# Patient Record
Sex: MALE | Race: WHITE | NOT HISPANIC OR LATINO | Employment: UNEMPLOYED
[De-identification: names, ages, dates, MRNs, and addresses within clinical notes are randomized per-mention and may not be internally consistent; named-entity substitution may affect disease eponyms.]

---

## 2023-12-19 ENCOUNTER — APPOINTMENT (OUTPATIENT)
Dept: MEDICAL GROUP | Facility: CLINIC | Age: 33
End: 2023-12-19
Payer: MEDICAID

## 2023-12-21 ENCOUNTER — HOSPITAL ENCOUNTER (OUTPATIENT)
Facility: MEDICAL CENTER | Age: 33
End: 2023-12-21
Payer: MEDICAID

## 2023-12-21 ENCOUNTER — OFFICE VISIT (OUTPATIENT)
Dept: MEDICAL GROUP | Facility: CLINIC | Age: 33
End: 2023-12-21
Payer: MEDICAID

## 2023-12-21 VITALS
WEIGHT: 209 LBS | OXYGEN SATURATION: 94 % | SYSTOLIC BLOOD PRESSURE: 134 MMHG | TEMPERATURE: 97.9 F | BODY MASS INDEX: 29.26 KG/M2 | HEART RATE: 79 BPM | HEIGHT: 71 IN | DIASTOLIC BLOOD PRESSURE: 84 MMHG

## 2023-12-21 DIAGNOSIS — F41.9 ANXIETY: ICD-10-CM

## 2023-12-21 DIAGNOSIS — K62.5 RECTAL BLEEDING: ICD-10-CM

## 2023-12-21 DIAGNOSIS — F43.10 PTSD (POST-TRAUMATIC STRESS DISORDER): ICD-10-CM

## 2023-12-21 DIAGNOSIS — F11.90 OPIOID USE DISORDER: ICD-10-CM

## 2023-12-21 DIAGNOSIS — Z11.3 ROUTINE SCREENING FOR STI (SEXUALLY TRANSMITTED INFECTION): ICD-10-CM

## 2023-12-21 DIAGNOSIS — R35.0 URINARY FREQUENCY: ICD-10-CM

## 2023-12-21 DIAGNOSIS — Z51.81 ENCOUNTER FOR MONITORING SUBOXONE MAINTENANCE THERAPY: ICD-10-CM

## 2023-12-21 DIAGNOSIS — Z79.899 ENCOUNTER FOR MONITORING SUBOXONE MAINTENANCE THERAPY: ICD-10-CM

## 2023-12-21 PROCEDURE — 80307 DRUG TEST PRSMV CHEM ANLYZR: CPT

## 2023-12-21 PROCEDURE — 3079F DIAST BP 80-89 MM HG: CPT

## 2023-12-21 PROCEDURE — 99214 OFFICE O/P EST MOD 30 MIN: CPT | Mod: GC

## 2023-12-21 PROCEDURE — 3075F SYST BP GE 130 - 139MM HG: CPT

## 2023-12-21 PROCEDURE — G0480 DRUG TEST DEF 1-7 CLASSES: HCPCS

## 2023-12-21 RX ORDER — BUPRENORPHINE AND NALOXONE 8; 2 MG/1; MG/1
1 FILM, SOLUBLE BUCCAL; SUBLINGUAL
Qty: 16 EACH | Refills: 0 | Status: SHIPPED | OUTPATIENT
Start: 2023-12-21 | End: 2023-12-29

## 2023-12-21 NOTE — PROGRESS NOTES
[facility]     PATIENT ID:  NAME:  Vikas Ghosh  MRN:               [id number]  YOB: 1990    Date: 3:27 PM      Resident: Jonah Velez M.D.    CC:  Opiate use disorder    HPI: Vikas Ghosh is a 33 y.o. male who presented for treatment related to opiate use disorder. Currently living in a sober living house. Last use of fentanyl was 3 weeks ago. When he was using more heavy he states he was up to 1g per day. Seen in the ED and wanted to start suboxone treatment. Was given 9 strips of 8-2mg Suboxone. Currently has 1.5 strips left. No other illicit drugs, no alcohol use. Cigarette currently smokes 1ppd.  Patient reports an additional history of PTSD and anxiety related to the time he spent in jail.  He states that he is interested in starting psychotherapy as well as discussing this with psychiatry.  He states that he had been using clonazepam 1 mg twice daily as well.  He also states that during his time in jail he was seen by a physician for concern about blood in the toilet.  He reports no blood in his urine but there would be blood mixed in with his stool.  He was evaluated for hemorrhoids and he states that he was advised to be seen by urology for concerns of potential prostate cancer.  Patient does note urinary frequency but denies any sensation of dysuria.    No problems updated.    REVIEW OF SYSTEMS:   Ten systems reviewed and were negative except as noted in the HPI.                PROBLEM LIST  There is no problem list on file for this patient.       PAST SURGICAL HISTORY:  No past surgical history on file.    FAMILY HISTORY:  No family history on file.    SOCIAL HISTORY:   Social History     Tobacco Use    Smoking status: Every Day     Types: Cigarettes     Passive exposure: Current    Smokeless tobacco: Never   Substance Use Topics    Alcohol use: Not Currently       ALLERGIES:  No Known Allergies    OUTPATIENT MEDICATIONS:    Current Outpatient Medications:      "tamsulosin (FLOMAX) 0.4 MG capsule, Take 0.4 mg by mouth 1/2 hour after breakfast., Disp: , Rfl:     albuterol 108 (90 Base) MCG/ACT Aero Soln inhalation aerosol, Inhale 2 Puffs., Disp: , Rfl:     mirtazapine (REMERON) 30 MG TABLET DISPERSIBLE, Take 30 mg by mouth every evening., Disp: , Rfl:     alprazolam (XANAX) 2 MG tablet, Take 2 mg by mouth at bedtime as needed for Sleep., Disp: , Rfl:     OLANZapine (ZYPREXA PO), Take  by mouth., Disp: , Rfl:     Buprenorphine HCl-Naloxone HCl 8-2 MG FILM, Place 1 Film under the tongue 2 times a day for 8 days., Disp: 16 Each, Rfl: 0    PHYSICAL EXAM:  Vitals:    12/21/23 1522   BP: 134/84   BP Location: Left arm   Patient Position: Sitting   BP Cuff Size: Adult   Pulse: 79   Temp: 36.6 °C (97.9 °F)   TempSrc: Temporal   SpO2: 94%   Weight: 94.8 kg (209 lb)   Height: 1.803 m (5' 11\")       General: Pt resting in NAD, pleasant and cooperative, appears slightly anxious  Skin:  Pink, warm and dry.  HEENT: NC/AT. EOMI. pupils approximately 2 mm, no associated injection  Lungs:  Symmetrical.  CTAB, good air movement, no adventitious sounds  Cardiovascular:  S1/S2 RRR, no murmurs rubs or gallops  Abdomen:  Abdomen is soft, nontender  Extremities:  Full range of motion.  CNS:  Muscle tone is normal. No gross focal neurologic deficits    Clinical  Opiate Withdrawal Scale: COWS  Resting pulse weight: 79, score of 0  Sweating over the past half hour: No reported chills or flushing, score of 0  Restlessness: Able to sit still, score of 0  Pupil size: Pupils normal for room light, score of 0  Bone or joint aches: Not present, score of 0  Runny nose or tearing: Not present, score of 0  GI upset over the past half hour: No GI upset, score of 0  Tremor: No tremor, score of 0  Yawning: No yawning, score of 0  Anxiety or irritability: Patient reports increased irritability and anxiousness, score of 1  Gooseflesh: Skin is smooth, score of 0  TOTAL SCORE: 1     DSM-V criteria for diagnosis of " opiate use disorder:  1.  Opioids are often taken in larger amounts or over a longer period of time than intended.  2.There is a persistent desire or unsuccessful efforts to cut down or control substance use  3.  A great deal of time is spent in activities necessary to obtain the opiate, use of opiate or recover from its effects.  4.  Craving, or strong desire to use opiates.  5.  Recurrent opiate use resulting in failure to fulfill major role obligations at work, school or home.  6.  Continued opiate use despite having persistent or recurrent social or interpersonal problems caused or exacerbated by the effects of the opioids.  7.  Important social, occupational or recreational activities are given up or reduced because of opiate use.  8.  Recurrent opiate use in situations in which it is physically hazardous.  9.  Continued use despite knowledge of having a persistent or recurrent physical or psychological problem that is likely to have been caused or exacerbated by opioids.  10.  Tolerance, as defined by either of the following: A need for markedly increased amounts of opiates to achieve intoxication or desired effect.  Markedly diminished effect with continued use of the same amount of an opioid.  11.  Withdrawal, as manifested by either of the following: The characteristic opiate withdrawal syndrome for the same (or a closely related) substance are taken to relieve or avoid withdrawal symptoms.  Severity index: Mild: 2-3 symptoms.  Moderate: 4-5 symptoms.  Severe: 6 or more symptoms  Patient reports positive on all 11 criteria. Indicating severe level index.     ASSESSMENT/PLAN:   33 y.o. male who presents to clinic for treatment related to opiate use disorder    Opiate use disorder: Patient reports longstanding history of fentanyl use up to 1 g/day in the past.  He states the last time he used was approximately 3 weeks ago.  Patient is currently living in a sober house.  He was seen in the emergency department  on 12/13/2023 and started on Suboxone treatment.  Patient states that since then he has been using Suboxone 8/2 mg.  He states that when he has used it twice daily he seems to have relief of his cravings.  He states he took approximately a half of a strip prior to this appointment.  Clinical opiate withdrawal scale currently a 1 which is positive for only increased irritability/anxiousness.  Per DSM-V criteria for diagnosis of opiate use disorder patient's scores 11 which is consistent with severe on their severity scale.  Controlled substance contract completed at this time.  Given patient's current level of use and relief of withdrawal symptoms with twice daily dosing of Suboxone 8/2 mg we will plan to prescribe 16 films of Suboxone 8/2 mg at this time with a plan to follow-up in clinic in 8 days.  Patient understands and agrees with this course.  Urine drug screen collected at clinic and will be sent for further evaluation and screening for Suboxone use.  Will order for hepatitis C, hepatitis B, HIV, CBC, CMP at this time for recommended lab work.    PTSD/anxiety: Will provide the patient with a referral to psychiatry for continued management.  Advised him given his history of controlled substance abuse I do not recommend continued clonazepam therapy at this time but he can discuss this further with psychiatry in the future.  Will provide the patient with a referral to psychology to start psychotherapy as well.    Rectal bleeding: Will order for colonoscopy for further evaluation.  Patient does not report current episodes of rectal bleeding but states it has been an intermittent issue over the past multiple months.  Patient has been evaluated for hemorrhoids in the past without any evidence for hemorrhoidal bleeding.    Urinary frequency: Patient notes that he has been advised to see a urologist for evaluation for prostate cancer.  I have advised him that this is unlikely given his age but will provide a referral  for urology at this time for further evaluation.  Will provide STD screening currently as well as urinalysis.    Problem List Items Addressed This Visit    None  Visit Diagnoses       Rectal bleeding        Relevant Orders    Referral to GI for Colonoscopy    Urinary frequency        Relevant Orders    Referral to Urology    Encounter for monitoring Suboxone maintenance therapy        Anxiety        Relevant Orders    Referral to Psychiatry    Referral to Psychology    PTSD (post-traumatic stress disorder)        Relevant Orders    Referral to Psychiatry    Referral to Psychology    Opioid use disorder        Relevant Medications    Buprenorphine HCl-Naloxone HCl 8-2 MG FILM    Other Relevant Orders    Referral to Psychiatry    Referral to Psychology    URINALYSIS    CBC WITH DIFFERENTIAL    Comp Metabolic Panel    HIV AG/AB COMBO ASSAY SCREENING    HEP C VIRUS ANTIBODY    HEP B SURFACE ANTIGEN    URINE DRUG SCREEN W/CONF (SEND OUT) (Completed)    BUPRENORPHINE,UR,QNT    Routine screening for STI (sexually transmitted infection)        Relevant Orders    Chlamydia/GC, PCR (Urine)    Controlled Substance Treatment Agreement            Jonah Velez M.D.  PGY-3  [facility]

## 2023-12-23 LAB
AMPHET CTO UR CFM-MCNC: NEGATIVE NG/ML
BARBITURATES CTO UR CFM-MCNC: NEGATIVE NG/ML
BENZODIAZ CTO UR CFM-MCNC: NEGATIVE NG/ML
CANNABINOIDS CTO UR CFM-MCNC: NEGATIVE NG/ML
COCAINE CTO UR CFM-MCNC: NEGATIVE NG/ML
CREAT UR-MCNC: 170.6 MG/DL (ref 20–400)
DRUG COMMENT 753798: NORMAL
METHADONE CTO UR CFM-MCNC: NEGATIVE NG/ML
OPIATES CTO UR CFM-MCNC: NEGATIVE NG/ML
PCP CTO UR CFM-MCNC: NEGATIVE NG/ML
PROPOXYPH CTO UR CFM-MCNC: NEGATIVE NG/ML

## 2023-12-25 LAB
BUPRENO GLUC URQNT 2010093: 48 NG/ML
BUPRENORPH URQNT L34157: <2 NG/ML
BUPRENORPHINE UR-MCNC: 24 NG/ML
NALOX URQNT 2005863: <100 NG/ML
NORBUPR GLUC URQNT 2010094: 69 NG/ML

## 2023-12-29 ENCOUNTER — OFFICE VISIT (OUTPATIENT)
Dept: MEDICAL GROUP | Facility: CLINIC | Age: 33
End: 2023-12-29
Payer: MEDICAID

## 2023-12-29 VITALS
WEIGHT: 206 LBS | HEIGHT: 71 IN | OXYGEN SATURATION: 98 % | TEMPERATURE: 97.9 F | SYSTOLIC BLOOD PRESSURE: 134 MMHG | DIASTOLIC BLOOD PRESSURE: 84 MMHG | HEART RATE: 82 BPM | BODY MASS INDEX: 28.84 KG/M2

## 2023-12-29 DIAGNOSIS — F11.90 OPIOID USE DISORDER: ICD-10-CM

## 2023-12-29 PROCEDURE — 3079F DIAST BP 80-89 MM HG: CPT

## 2023-12-29 PROCEDURE — 99213 OFFICE O/P EST LOW 20 MIN: CPT | Mod: GE

## 2023-12-29 PROCEDURE — 3075F SYST BP GE 130 - 139MM HG: CPT

## 2023-12-30 RX ORDER — BUPRENORPHINE AND NALOXONE 8; 2 MG/1; MG/1
1 FILM, SOLUBLE BUCCAL; SUBLINGUAL 2 TIMES DAILY
Qty: 14 EACH | Refills: 0 | Status: SHIPPED | OUTPATIENT
Start: 2023-12-30 | End: 2024-01-06

## 2023-12-30 NOTE — PROGRESS NOTES
[facility]     PATIENT ID:  NAME:  Vikas Ghosh  MRN:               [id number]  YOB: 1990    Date: 11:04 AM      Resident: Jonah Velez M.D.    CC:  Suboxone refill      HPI: Vikas Ghosh is a 33 y.o. male who presented for opioid use disorder treatment. The patient states he has been doing well since his last visit at the clinic. He has been taking suboxone as prescribed an has 1 strip left at this time.  He states he has not been experiencing any opioid withdrawal  symptoms other than mild anxiety which he has a longstanding history of.  He denies any cravings at this time and is happy with current therapy.  He is still living in a sober living house and will be starting work in the near future. Patient states otherwise he has been doing well and has no further concerns.     No problems updated.    REVIEW OF SYSTEMS:   Ten systems reviewed and were negative except as noted in the HPI.                PROBLEM LIST  There is no problem list on file for this patient.       PAST SURGICAL HISTORY:  No past surgical history on file.    FAMILY HISTORY:  No family history on file.    SOCIAL HISTORY:   Social History     Tobacco Use    Smoking status: Every Day     Types: Cigarettes     Passive exposure: Current    Smokeless tobacco: Never   Substance Use Topics    Alcohol use: Not Currently       ALLERGIES:  No Known Allergies    OUTPATIENT MEDICATIONS:    Current Outpatient Medications:     Buprenorphine HCl-Naloxone HCl 8-2 MG FILM, Place 1 Film under the tongue 2 times a day for 7 days., Disp: 14 Each, Rfl: 0    tamsulosin (FLOMAX) 0.4 MG capsule, Take 0.4 mg by mouth 1/2 hour after breakfast., Disp: , Rfl:     albuterol 108 (90 Base) MCG/ACT Aero Soln inhalation aerosol, Inhale 2 Puffs., Disp: , Rfl:     mirtazapine (REMERON) 30 MG TABLET DISPERSIBLE, Take 30 mg by mouth every evening., Disp: , Rfl:     alprazolam (XANAX) 2 MG tablet, Take 2 mg by mouth at bedtime as needed for  "Sleep., Disp: , Rfl:     OLANZapine (ZYPREXA PO), Take  by mouth., Disp: , Rfl:     PHYSICAL EXAM:  Vitals:    12/29/23 1503   BP: 134/84   BP Location: Left arm   Patient Position: Sitting   BP Cuff Size: Adult   Pulse: 82   Temp: 36.6 °C (97.9 °F)   TempSrc: Temporal   SpO2: 98%   Weight: 93.4 kg (206 lb)   Height: 1.803 m (5' 11\")       General: Pt resting in NAD, pleasant and cooperative   Skin:  Pink, warm and dry. No diaphoresis.  HEENT: NC/AT. EOMI. Pupils 3mm  Lungs:  Symmetrical.  CTAB, good air movement, no adventitious sounds   Cardiovascular:  S1/S2 RRR, no murmurs rubs or gallops   Abdomen:  Abdomen is soft, nontender  Extremities:  Full range of motion.  CNS:  Muscle tone is normal. No gross focal neurologic deficits, no tremor.     Clinical  Opiate Withdrawal Scale: COWS  Resting pulse weight: 79, score of 0  Sweating over the past half hour: No reported chills or flushing, score of 0  Restlessness: Able to sit still, score of 0  Pupil size: Pupils normal for room light, score of 0  Bone or joint aches: Not present, score of 0  Runny nose or tearing: Not present, score of 0  GI upset over the past half hour: No GI upset, score of 0  Tremor: No tremor, score of 0  Yawning: No yawning, score of 0  Anxiety or irritability: Patient reports increased irritability and anxiousness, score of 1  Gooseflesh: Skin is smooth, score of 0  TOTAL SCORE: 1     ASSESSMENT/PLAN:   33 y.o. male who presents to the clinic for treatment of opioid use disorder. The patient states he has been doing well on current treatment dose of buprenorphine Naloxone 8-2mg BID. He does note that he would prefer the pills over the strips but has not found a pharmacy that would have supply of the pills. He denies any current withdrawal symptoms and denies any cravings. He has reestablished with psychiatry and will be seeing them via virtual visit next week. He is continuing to live in a sober living house which has been working well for " him.  He will be starting work in the next 2 weeks and is looking forward to this. Lab orders from last weeks visit are still pending and patient states he will have these completed soon. Otherwise the patient appears much more relaxed and less anxious on examination. No concerns with current treatment plan. Will prescribe 1 weeks worth of current buprenorphine Naloxone 8-2mg supply and plan to have patient to return to clinic on Friday 1/5/24 to continue treatment plan. Counseled patient that I would not be available today but will continue to follow him in 2 weeks when my clinic schedule is available on Fridays.     Problem List Items Addressed This Visit    None  Visit Diagnoses       Opioid use disorder        Relevant Medications    Buprenorphine HCl-Naloxone HCl 8-2 MG FILM            Jonah Velez M.D.  PGY-3  [facility]

## 2024-01-02 ENCOUNTER — OFFICE VISIT (OUTPATIENT)
Dept: UROLOGY | Facility: MEDICAL CENTER | Age: 34
End: 2024-01-02
Payer: MEDICAID

## 2024-01-02 VITALS
SYSTOLIC BLOOD PRESSURE: 109 MMHG | WEIGHT: 211.1 LBS | HEART RATE: 82 BPM | OXYGEN SATURATION: 99 % | BODY MASS INDEX: 29.55 KG/M2 | DIASTOLIC BLOOD PRESSURE: 60 MMHG | HEIGHT: 71 IN

## 2024-01-02 DIAGNOSIS — R35.0 URINARY FREQUENCY: Primary | ICD-10-CM

## 2024-01-02 LAB
APPEARANCE UR: CLEAR
BILIRUB UR STRIP-MCNC: NORMAL MG/DL
COLOR UR AUTO: YELLOW
GLUCOSE UR STRIP.AUTO-MCNC: NORMAL MG/DL
KETONES UR STRIP.AUTO-MCNC: NORMAL MG/DL
LEUKOCYTE ESTERASE UR QL STRIP.AUTO: NORMAL
NITRITE UR QL STRIP.AUTO: NORMAL
PH UR STRIP.AUTO: 7 [PH] (ref 5–8)
POC POST-VOID: 16 ML
POC PRE-VOID: 126 ML
PROT UR QL STRIP: NORMAL MG/DL
RBC UR QL AUTO: NORMAL
SP GR UR STRIP.AUTO: 1.02
UROBILINOGEN UR STRIP-MCNC: 0.2 MG/DL

## 2024-01-02 PROCEDURE — 99204 OFFICE O/P NEW MOD 45 MIN: CPT

## 2024-01-02 PROCEDURE — 51798 US URINE CAPACITY MEASURE: CPT

## 2024-01-02 PROCEDURE — 81002 URINALYSIS NONAUTO W/O SCOPE: CPT

## 2024-01-02 PROCEDURE — 3074F SYST BP LT 130 MM HG: CPT

## 2024-01-02 PROCEDURE — 3078F DIAST BP <80 MM HG: CPT

## 2024-01-02 RX ORDER — OXYBUTYNIN CHLORIDE 10 MG/1
10 TABLET, EXTENDED RELEASE ORAL DAILY
Qty: 30 TABLET | Refills: 3 | Status: SHIPPED | OUTPATIENT
Start: 2024-01-02

## 2024-01-02 NOTE — PROGRESS NOTES
Subjective  Vikas Ghosh is a 33 y.o. male who presents today for evaluation of urinary frequency- voiding every 1-2 hours, nocturia voiding 6-7 times per night, hesitancy at times, and urgency without leakage.  Patient denies hematuria, weak urinary stream, and dysuria    Patient reports that he was in [facility] for 20 months.  At that time patient was using fentanyl.  Patient reports that he has been clean and is sober at this time patient. Patient reports that urinary frequency was better with fentanyl use, only voiding 2 times per day and at this time voiding every 1-2 hours and feeling like he does not completely empty.  Patient denies incontinence.  Patient reports that he has regular bowel movements daily with no issues.    Patient was started on Flomax 8 months ago presents, and patient reports that symptoms have mildly improved but are still bothersome.     At this time, patient requesting PSA lab to be done because patient is concerned about size of prostate related to symptoms.      No family history on file.    Social History     Socioeconomic History    Marital status: Single     Spouse name: Not on file    Number of children: Not on file    Years of education: Not on file    Highest education level: Not on file   Occupational History    Not on file   Tobacco Use    Smoking status: Every Day     Types: Cigarettes     Passive exposure: Current    Smokeless tobacco: Never   Vaping Use    Vaping Use: Never used   Substance and Sexual Activity    Alcohol use: Not Currently     Alcohol/week: 3.6 oz     Types: 6 Cans of beer per week     Comment: occ    Drug use: Not Currently    Sexual activity: Not on file   Other Topics Concern    Not on file   Social History Narrative    Not on file     Social Determinants of Health     Financial Resource Strain: Not on file   Food Insecurity: Not on file   Transportation Needs: Not on file   Physical Activity: Not on file   Stress: Not on file   Social  "Connections: Not on file   Intimate Partner Violence: Not on file   Housing Stability: Not on file       No past surgical history on file.    No past medical history on file.    Current Outpatient Medications   Medication Sig Dispense Refill    oxybutynin SR (DITROPAN-XL) 10 MG CR tablet Take 1 Tablet by mouth every day. 30 Tablet 3    albuterol 108 (90 Base) MCG/ACT Aero Soln inhalation aerosol Inhale 2 Puffs.      Buprenorphine HCl-Naloxone HCl 8-2 MG FILM Place 1 Film under the tongue 2 times a day for 7 days. (Patient not taking: Reported on 1/2/2024) 14 Each 0    tamsulosin (FLOMAX) 0.4 MG capsule Take 0.4 mg by mouth 1/2 hour after breakfast. (Patient not taking: Reported on 1/2/2024)      mirtazapine (REMERON) 30 MG TABLET DISPERSIBLE Take 30 mg by mouth every evening. (Patient not taking: Reported on 1/2/2024)      alprazolam (XANAX) 2 MG tablet Take 2 mg by mouth at bedtime as needed for Sleep. (Patient not taking: Reported on 1/2/2024)      OLANZapine (ZYPREXA PO) Take  by mouth. (Patient not taking: Reported on 1/2/2024)       No current facility-administered medications for this visit.       No Known Allergies    Objective  /60 (BP Location: Left arm, Patient Position: Sitting, BP Cuff Size: Large adult)   Pulse 82   Ht 1.803 m (5' 11\")   Wt 95.8 kg (211 lb 1.6 oz)   SpO2 99%   Physical Exam  Constitutional:       Appearance: Normal appearance.   HENT:      Head: Normocephalic and atraumatic.   Eyes:      Extraocular Movements: Extraocular movements intact.   Pulmonary:      Effort: Pulmonary effort is normal.      Breath sounds: Normal breath sounds.   Neurological:      Mental Status: He is alert.   Psychiatric:         Mood and Affect: Mood normal.         Behavior: Behavior normal.         Labs:   none    Imaging:   none    Assessment    At this time, I have started patient on oxybutynin 10 mg p.o. daily.  Patient may continue taking his medication with Flomax.      I have also ordered a " PSA per patient request to evaluate size of prostate related to patient's symptoms I have informed patient that insurance may not cover this lab and patient may have to pay for it. At time of blood draw patient may decline PSA if unaffordable.       Plan    Problem List Items Addressed This Visit       Urinary frequency - Primary     Continue taking Flomax, and please start oxybutynin 10 mg p.o. daily.    Complete PSA before 3-month follow-up.  If lab test not covered by insurance and unaffordable this lab does not have to be completed as age is below guidelines.  This lab is being done per patient request.    Return to clinic 3 months.  I have also informed patient that he needs to see a primary care provider for labs to be completed as patient reports last labs were completed while he was in [facility] and does not have record of those previous labs.  Patient is concerned about ongoing health and management         Relevant Medications    oxybutynin SR (DITROPAN-XL) 10 MG CR tablet    Other Relevant Orders    POCT Bladder Scan (Completed)    POCT Urinalysis (Completed)    PROSTATE SPECIFIC AG SCREENING

## 2024-01-02 NOTE — ASSESSMENT & PLAN NOTE
Continue taking Flomax, and please start oxybutynin 10 mg p.o. daily.    Complete PSA before 3-month follow-up.  If lab test not covered by insurance and unaffordable this lab does not have to be completed as age is below guidelines.  This lab is being done per patient request.    Return to clinic 3 months.  I have also informed patient that he needs to see a primary care provider for labs to be completed as patient reports last labs were completed while he was in [facility] and does not have record of those previous labs.  Patient is concerned about ongoing health and management

## 2024-01-02 NOTE — PATIENT INSTRUCTIONS
Start oxybutynin today and take daily. This medication can cause constipation, dry eye and dry mouth.    Please continue taking flomax daily    Get your PSA done before 3 months visit

## 2024-01-08 ENCOUNTER — OFFICE VISIT (OUTPATIENT)
Dept: MEDICAL GROUP | Facility: CLINIC | Age: 34
End: 2024-01-08
Payer: MEDICAID

## 2024-01-08 VITALS
OXYGEN SATURATION: 99 % | HEART RATE: 75 BPM | TEMPERATURE: 97.3 F | DIASTOLIC BLOOD PRESSURE: 70 MMHG | RESPIRATION RATE: 14 BRPM | BODY MASS INDEX: 29.26 KG/M2 | WEIGHT: 209 LBS | SYSTOLIC BLOOD PRESSURE: 115 MMHG | HEIGHT: 71 IN

## 2024-01-08 DIAGNOSIS — F11.90 OPIOID USE DISORDER: ICD-10-CM

## 2024-01-08 PROCEDURE — 99213 OFFICE O/P EST LOW 20 MIN: CPT | Performed by: FAMILY MEDICINE

## 2024-01-08 PROCEDURE — 3078F DIAST BP <80 MM HG: CPT | Performed by: FAMILY MEDICINE

## 2024-01-08 PROCEDURE — 3074F SYST BP LT 130 MM HG: CPT | Performed by: FAMILY MEDICINE

## 2024-01-08 RX ORDER — BUPRENORPHINE AND NALOXONE 8; 2 MG/1; MG/1
1 FILM, SOLUBLE BUCCAL; SUBLINGUAL 2 TIMES DAILY
Qty: 28 EACH | Refills: 0 | Status: SHIPPED | OUTPATIENT
Start: 2024-01-08 | End: 2024-01-22 | Stop reason: SDUPTHER

## 2024-01-08 NOTE — PROGRESS NOTES
"Subjective:     CC: suboxone refill    HPI:   Vikas presents today with:    Opioid use disorder: meets DSM-V criteria for severe OUD    Substance use history:  Started opioids in his teenage years  Most recently preference was using fentanyl via smoking  Reports he has used via IV in the past  When asked about other substances used: he reports \"all of them\"  Currently in a [facility] sober living facility since December 8th, 2023.  Unable to use any substances at present, other than smoking cigarettes.  Might re-start marijuana in the future, helps with his anxiety.  Has history of selling substances in the past as well.  Been in and out of shelter multiple times.  Girlfriend is sober for 2 years as well.  Starting a job (), though start date was pushed to next week (Wed), which he feels irritated by.  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  Reports he can stay in his sober living situation as long as he needs to.  Has history of suboxone use on and off in the past, including while using fentanyl and while in shelter.  He feels confident that he doesn't want to use opioids, motivated to stay out of legal trouble, and wouldn't use even if he couldn't access the suboxone, but it really helps him.    Does not like NA or AA groups.  Asked about cravings: he reports thinking about it sometimes, but it's not bad.  Denies concerning dreams of use.  Denies any concerning side effects of the suboxone films.  Tolerating them well.  Didn't get much sleep last night.  Denies problems with sleep initiation.    Previously had psychiatrist and psychologist.  Plans to re-establish.  Wants to go back on previous regimen for anxiety and PTSD including: xyprexa, mood stabilizer and alprazolam.  Per chart, he was possibly on mirtazepine?  Has tried many other medications including vistaril, which he didn't think helped.  And klonipin which was too sedating.    He is not interested in trying other things or starting " "with one of his prior medications at a time.    Asked for referral info for urology and GI.  On flomax, but hasn't been taking it really    Problem   Opioid Use Disorder       Current Outpatient Medications Ordered in Epic   Medication Sig Dispense Refill    Buprenorphine HCl-Naloxone HCl 8-2 MG FILM Place 1 Film under the tongue 2 times a day for 14 days. 28 Each 0    oxybutynin SR (DITROPAN-XL) 10 MG CR tablet Take 1 Tablet by mouth every day. 30 Tablet 3    albuterol 108 (90 Base) MCG/ACT Aero Soln inhalation aerosol Inhale 2 Puffs.      tamsulosin (FLOMAX) 0.4 MG capsule Take 0.4 mg by mouth 1/2 hour after breakfast. (Patient not taking: Reported on 1/2/2024)      mirtazapine (REMERON) 30 MG TABLET DISPERSIBLE Take 30 mg by mouth every evening. (Patient not taking: Reported on 1/2/2024)      alprazolam (XANAX) 2 MG tablet Take 2 mg by mouth at bedtime as needed for Sleep. (Patient not taking: Reported on 1/2/2024)      OLANZapine (ZYPREXA PO) Take  by mouth. (Patient not taking: Reported on 1/2/2024)       No current [facility]-ordered facility-administered medications on file.       Objective:     Exam:  /70 (BP Location: Left arm, Patient Position: Sitting, BP Cuff Size: Adult)   Pulse 75   Temp 36.3 °C (97.3 °F) (Temporal)   Resp 14   Ht 1.803 m (5' 11\")   Wt 94.8 kg (209 lb)   SpO2 99%   BMI 29.15 kg/m²  Body mass index is 29.15 kg/m².    Gen: Alert and oriented, No acute distress.  Head:  NCAT, EOMI, no conjunctival injection or discharge, hearing grossly intact  Neck: Neck is supple   Lungs: Normal respiratory effort  CV: Regular rate   MSK: Unassisted gait, normal tone  Derm: No lesions on exposed skin  Psych: Normal mood and affect    Assessment & Plan:     33 y.o. male with the following -     Problem List Items Addressed This Visit       Opioid use disorder     Severe OUD in remission  Refilled suboxone 8/2mg to use 1 film twice daily.  Disp #28, to [facility] [address] [facility], no refills.    Stable on current " dose, no side effects or withdrawal symptoms  Stable living situation, starting job soon.  Gave phone number for psychiatry and psychology referral that was ordered in December.  Not interested in other treatment programs at this time such as NA.  CS agreement on file.  UDS up to date.  Reviewed [place]  report which is consistent with prescribing history.  No red flags for medication misuse or diversion.    Re-printed lab slip and referral information for other co-morbid conditions.  Offered vaccines, which patient declined.           Relevant Medications    Buprenorphine HCl-Naloxone HCl 8-2 MG FILM       Follow-up: 2 weeks

## 2024-01-08 NOTE — ASSESSMENT & PLAN NOTE
Severe OUD in remission  Refilled suboxone 8/2mg to use 1 film twice daily.  Disp #28, to [facility] [address] [facility], no refills.    Stable on current dose, no side effects or withdrawal symptoms  Stable living situation, starting job soon.  Gave phone number for psychiatry and psychology referral that was ordered in December.  Not interested in other treatment programs at this time such as NA.  CS agreement on file.  UDS up to date.  Reviewed [place]  report which is consistent with prescribing history.  No red flags for medication misuse or diversion.    Re-printed lab slip and referral information for other co-morbid conditions.  Offered vaccines, which patient declined.

## 2024-01-22 ENCOUNTER — OFFICE VISIT (OUTPATIENT)
Dept: MEDICAL GROUP | Facility: CLINIC | Age: 34
End: 2024-01-22
Payer: MEDICAID

## 2024-01-22 VITALS
WEIGHT: 216 LBS | DIASTOLIC BLOOD PRESSURE: 65 MMHG | HEIGHT: 71 IN | TEMPERATURE: 97.9 F | OXYGEN SATURATION: 100 % | HEART RATE: 64 BPM | BODY MASS INDEX: 30.24 KG/M2 | RESPIRATION RATE: 14 BRPM | SYSTOLIC BLOOD PRESSURE: 115 MMHG

## 2024-01-22 DIAGNOSIS — F41.9 ANXIETY: ICD-10-CM

## 2024-01-22 DIAGNOSIS — F11.90 OPIOID USE DISORDER: ICD-10-CM

## 2024-01-22 PROCEDURE — 99213 OFFICE O/P EST LOW 20 MIN: CPT | Performed by: FAMILY MEDICINE

## 2024-01-22 PROCEDURE — 3074F SYST BP LT 130 MM HG: CPT | Performed by: FAMILY MEDICINE

## 2024-01-22 PROCEDURE — 3078F DIAST BP <80 MM HG: CPT | Performed by: FAMILY MEDICINE

## 2024-01-22 RX ORDER — BUPRENORPHINE AND NALOXONE 8; 2 MG/1; MG/1
1 FILM, SOLUBLE BUCCAL; SUBLINGUAL 2 TIMES DAILY
Qty: 42 EACH | Refills: 0 | Status: SHIPPED | OUTPATIENT
Start: 2024-01-22 | End: 2024-02-26 | Stop reason: SDUPTHER

## 2024-01-22 NOTE — LETTER
[facility]     January 22, 2024    Patient: Vikas Ghosh   YOB: 1990   Date of Visit: 1/22/2024       To Whom It May Concern:    Vikas Ghosh was seen and treated in our department on 1/22/2024. It is my medical recommendation that Vikas uses marijuana to treat his anxiety and mental health concerns.    Sincerely,     Shani Castro M.D.

## 2024-01-22 NOTE — PROGRESS NOTES
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  Plans to do that this week.  Thinking about getting his medicinal marijuana card instead of re-starting other anxiety meds.  Was previously on alprazolam, zyprexa and vistaril, possibly mirtazepine (per chart) for anxiety and PTSD.    He reports marijuana helps on days when he feels very overwhelmed.  He did learn that he can use marijuana at the sober living facility as long as he has doctor support.    Smoking 1 ppd nicotine.  Started smoking at age 12.  Goal is to get down to smoking twice a day, just on work breaks or at night.  Using vape pen to try and replace it.  Takes apx 3 days to go through a vape cartridge.      Also has urology appt scheduled in April    Social Hx: girlfriend sober for 2 years.  They help support each other in recovery.     Problem   Anxiety   Opioid Use Disorder       Current Outpatient Medications Ordered in Epic   Medication Sig Dispense Refill    Buprenorphine HCl-Naloxone HCl 8-2 MG FILM Place 1 Film under the tongue 2 times a day for 21 days. 42 Each 0    oxybutynin SR (DITROPAN-XL) 10 MG CR tablet Take 1 Tablet by mouth every day. 30 Tablet 3    tamsulosin (FLOMAX) 0.4 MG capsule Take 0.4 mg by mouth 1/2 hour after breakfast. (Patient not taking: Reported on 1/2/2024)      mirtazapine (REMERON) 30 MG TABLET DISPERSIBLE Take 30 mg by mouth every evening. (Patient not taking: Reported on 1/2/2024)      alprazolam (XANAX) 2 MG tablet Take 2 mg by mouth at bedtime as needed for Sleep. (Patient not taking: Reported on 1/2/2024)      OLANZapine (ZYPREXA PO) Take  by mouth. (Patient not taking: Reported on 1/2/2024)       No current  "Epic-ordered facility-administered medications on file.       No past medical history on file.     No past surgical history on file.     No family history on file.       ROS:  Psych: +anxiety, fluctuating sleep      Objective:     Exam:  /65 (BP Location: Right arm, Patient Position: Sitting, BP Cuff Size: Adult)   Pulse 64   Temp 36.6 °C (97.9 °F) (Temporal)   Resp 14   Ht 1.803 m (5' 11\")   Wt 98 kg (216 lb)   SpO2 100%   BMI 30.13 kg/m²  Body mass index is 30.13 kg/m².    Gen: Alert and oriented, No apparent distress.  Head:  NCAT, EOMI, sclera clear without discharge  Neck: Neck is supple without lymphadenopathy.  Lungs: Normal effort, CTA bilaterally, no wheezes, rhonchi, or rales  CV: Regular rate and rhythm. No murmurs, rubs, or gallops.  Abd:   Non-distended, soft  Ext: No clubbing, cyanosis, edema.  MSK: Unassisted gait  Derm: No lesions on exposed skin.    Psych: normal mood and affect.  Calmer affect today.  Good eye contact.  No pressured speech.  Logical thought process and content.        Assessment & Plan:     33 y.o. male with the following -     Problem List Items Addressed This Visit       Opioid use disorder     DSM-V criteria for severe opioid use disorder, in remission  On suboxone 8-2mg films BID, which is helping reduce cravings and anxiety  He is in sober living and has social support  Has been able to get work  Plans to also establish with psychology/psychiatry  Not interested in NA    CS agreement on file and UTD  UDS on initial visit was consistent with prescription  Reviewed [place]  report which does not show any red flags for medication misuse  Refill: Rx suboxone 8-2mg #42 for 3 week supply, no refills, follow-up appt scheduled 2/12           Relevant Medications    Buprenorphine HCl-Naloxone HCl 8-2 MG FILM    Anxiety     Patient was previously on alprazolam and mood stabilizers for anxiety and PTSD  He has referral for psych  We have discussed risks associated with mixing " sedatives and his suboxone as well as addictive nature of benzodiazepines long term  We have also discussed alternative anxity medication regimens, which he didn't want to start at this time until discussing with psych  He reports some improvement in his anxiety since starting the suboxone, though levels fluctuate.  He is considering using intermittent marijuana for his anxiety to avoid re-starting multiple sedating psych medications.  We did discuss the pros/cons of this approach and certainly want to avoid any legal implications or implications for losing his stable housing situation.  Thus I do feel the benefits of his marijuana use outweigh the risks of long-term benzodiazepine dependence (as well as the combination of BZD with buprenorphine) or the social-legal risk of using marijuana without physician support.  Therefore I wrote a letter for the patient in support of marijuana for his anxiety treatment.              Follow-up: 3 weeks

## 2024-01-22 NOTE — LETTER
[facility]     January 22, 2024    Patient: Vikas Ghosh   YOB: 1990   Date of Visit: 1/22/2024       To Whom It May Concern:    Vikas Ghosh was seen and treated in our department on 1/22/2024. It is my impression that marijuana is medically indicated for Vikas's anxiety treatment.    Sincerely,     Shani Castro M.D.

## 2024-01-23 PROBLEM — F41.9 ANXIETY: Status: ACTIVE | Noted: 2024-01-23

## 2024-01-23 NOTE — ASSESSMENT & PLAN NOTE
Patient was previously on alprazolam and mood stabilizers for anxiety and PTSD  He has referral for psych  We have discussed risks associated with mixing sedatives and his suboxone as well as addictive nature of benzodiazepines long term  We have also discussed alternative anxity medication regimens, which he didn't want to start at this time until discussing with psych  He reports some improvement in his anxiety since starting the suboxone, though levels fluctuate.  He is considering using intermittent marijuana for his anxiety to avoid re-starting multiple sedating psych medications.  We did discuss the pros/cons of this approach and certainly want to avoid any legal implications or implications for losing his stable housing situation.  Thus I do feel the benefits of his marijuana use outweigh the risks of long-term benzodiazepine dependence (as well as the combination of BZD with buprenorphine) or the social-legal risk of using marijuana without physician support.  Therefore I wrote a letter for the patient in support of marijuana for his anxiety treatment.

## 2024-01-23 NOTE — ASSESSMENT & PLAN NOTE
DSM-V criteria for severe opioid use disorder, in remission  On suboxone 8-2mg films BID, which is helping reduce cravings and anxiety  He is in sober living and has social support  Has been able to get work  Plans to also establish with psychology/psychiatry  Not interested in NA    CS agreement on file and UTD  UDS on initial visit was consistent with prescription  Reviewed [place]  report which does not show any red flags for medication misuse  Refill: Rx suboxone 8-2mg #42 for 3 week supply, no refills, follow-up appt scheduled 2/12

## 2024-02-26 ENCOUNTER — TELEPHONE (OUTPATIENT)
Dept: MEDICAL GROUP | Facility: CLINIC | Age: 34
End: 2024-02-26
Payer: MEDICAID

## 2024-02-26 DIAGNOSIS — F11.90 OPIOID USE DISORDER: ICD-10-CM

## 2024-02-26 RX ORDER — BUPRENORPHINE AND NALOXONE 8; 2 MG/1; MG/1
1 FILM, SOLUBLE BUCCAL; SUBLINGUAL 2 TIMES DAILY
Qty: 14 EACH | Refills: 0 | Status: SHIPPED | OUTPATIENT
Start: 2024-02-26 | End: 2024-03-04 | Stop reason: SDUPTHER

## 2024-02-26 NOTE — TELEPHONE ENCOUNTER
Caller Name: Vikas Treviño  Call Back Number: [phone] (home)       How would the patient prefer to be contacted with a response: Phone call do NOT leave a detailed message    Medication,  patient called requesting a refill on his Medication, he stated he couldn't come in last month as he has a new job and and doesn't know that what day, but he will call to make an appointment though he needs refills on his medication please advise.

## 2024-02-27 NOTE — PROGRESS NOTES
Vikas is known to me.  He called and left a message asking for his suboxone med refill since he wasn't able to make his last appt. I reviewed his NV  report.  I scheduled him for 3/4 and sent in a week supply of his medicine as a bridge.  Asked the office to call him to confirm the appt on 3/4.

## 2024-03-04 ENCOUNTER — OFFICE VISIT (OUTPATIENT)
Dept: MEDICAL GROUP | Facility: CLINIC | Age: 34
End: 2024-03-04
Payer: MEDICAID

## 2024-03-04 ENCOUNTER — HOSPITAL ENCOUNTER (OUTPATIENT)
Facility: MEDICAL CENTER | Age: 34
End: 2024-03-04
Attending: FAMILY MEDICINE
Payer: MEDICAID

## 2024-03-04 VITALS
HEIGHT: 71 IN | SYSTOLIC BLOOD PRESSURE: 110 MMHG | OXYGEN SATURATION: 97 % | HEART RATE: 97 BPM | BODY MASS INDEX: 30.45 KG/M2 | WEIGHT: 217.5 LBS | DIASTOLIC BLOOD PRESSURE: 64 MMHG | TEMPERATURE: 97.6 F

## 2024-03-04 DIAGNOSIS — F11.90 OPIOID USE DISORDER: ICD-10-CM

## 2024-03-04 DIAGNOSIS — F41.9 ANXIETY: ICD-10-CM

## 2024-03-04 DIAGNOSIS — F17.210 CIGARETTE NICOTINE DEPENDENCE WITHOUT COMPLICATION: ICD-10-CM

## 2024-03-04 DIAGNOSIS — J45.20 INTERMITTENT ASTHMA WITHOUT COMPLICATION, UNSPECIFIED ASTHMA SEVERITY: ICD-10-CM

## 2024-03-04 PROCEDURE — G0480 DRUG TEST DEF 1-7 CLASSES: HCPCS

## 2024-03-04 PROCEDURE — 3078F DIAST BP <80 MM HG: CPT | Performed by: FAMILY MEDICINE

## 2024-03-04 PROCEDURE — 80307 DRUG TEST PRSMV CHEM ANLYZR: CPT

## 2024-03-04 PROCEDURE — 99214 OFFICE O/P EST MOD 30 MIN: CPT | Performed by: FAMILY MEDICINE

## 2024-03-04 PROCEDURE — 3074F SYST BP LT 130 MM HG: CPT | Performed by: FAMILY MEDICINE

## 2024-03-04 RX ORDER — NALOXONE HYDROCHLORIDE 4 MG/.1ML
SPRAY NASAL
Qty: 1 EACH | Refills: 2 | Status: SHIPPED | OUTPATIENT
Start: 2024-03-04

## 2024-03-04 RX ORDER — ALBUTEROL SULFATE 90 UG/1
2 AEROSOL, METERED RESPIRATORY (INHALATION) EVERY 4 HOURS PRN
Qty: 1 EACH | Refills: 2 | Status: SHIPPED | OUTPATIENT
Start: 2024-03-04

## 2024-03-04 RX ORDER — BUPRENORPHINE AND NALOXONE 8; 2 MG/1; MG/1
1 FILM, SOLUBLE BUCCAL; SUBLINGUAL 2 TIMES DAILY
Qty: 42 EACH | Refills: 0 | Status: SHIPPED | OUTPATIENT
Start: 2024-03-04 | End: 2024-03-25

## 2024-03-04 NOTE — PROGRESS NOTES
Subjective:     CC: MAT clinic    HPI:   Vikas presents today for suboxone refill.      Opioid use disorder:  Was on suboxone 8-2mg BID  Last appt was 1/22/24, missed his follow-up appt due to work.  Did not receive message about a bridge of his suboxone being sent in, so he has now been off of the suboxone.  Experiencing more cravings and low-grade opioid withdrawal symptoms including: +hot/cold, joint aching, poor sleep, GI supset, runny nose, & goosebumps.  Denies tremors, diarrhea and use dreams.  Doesn't really dream at night.  Just got his own place, no longer staying with sober living.    Denies any opioid use while not being on his suboxone.  He has been using daily marijuana for anxiety, both morning and night, has his medical marijuana card.  On parole.  Psychiatry: telehealth appt at 5pm today    Nicotine dependence:  Smoking 1 ppd nicotine.  Started smoking at age 12.  Goal is to get down to smoking twice a day, just on work breaks or at night.  Using vape pen to try and replace it.  Takes apx 3 days to go through a vape cartridge.     All cigarettes.  Not vaping.      Asthma: mild, intermittent  Uses inhaler when he gets a cold, requests refill    Plans to schedule with urology and GI after established with work.      Social Hx:  Work is going ok  Has adequate housing and transportation  Spends time with girlfriend who is also sober from former drug use.   Motivated to continue to abstain from drug use other than marijuana, to avoid further criminal consequences.    Problem   Intermittent Asthma Without Complication   Cigarette Nicotine Dependence Without Complication   Anxiety   Opioid Use Disorder       Current Outpatient Medications Ordered in Epic   Medication Sig Dispense Refill    Buprenorphine HCl-Naloxone HCl 8-2 MG FILM Place 1 Film under the tongue 2 times a day for 21 days. 42 Each 0    Naloxone (NARCAN) 4 MG/0.1ML Liquid One spray in one nostril for overdose and call 911. 1 Each 2     "albuterol 108 (90 Base) MCG/ACT Aero Soln inhalation aerosol Inhale 2 Puffs every four hours as needed for Shortness of Breath. 1 Each 2    oxybutynin SR (DITROPAN-XL) 10 MG CR tablet Take 1 Tablet by mouth every day. 30 Tablet 3     No current Epic-ordered facility-administered medications on file.       Past Medical History:   Diagnosis Date    Anxiety     Asthma     Nicotine dependence     Opioid use disorder in remission         No past surgical history on file.     No family history on file.       ROS: see HPI      Objective:     Exam:  /64 (BP Location: Left arm, Patient Position: Sitting, BP Cuff Size: Adult)   Pulse 97   Temp 36.4 °C (97.6 °F) (Temporal)   Ht 1.803 m (5' 11\")   Wt 98.7 kg (217 lb 8 oz)   SpO2 97%   BMI 30.34 kg/m²  Body mass index is 30.34 kg/m².    Gen: Alert and oriented, No apparent distress.  Head:  NCAT, EOMI, sclera clear without discharge  Neck: Neck is supple without lymphadenopathy.  Lungs: Normal effort, CTA bilaterally, no wheezes, rhonchi, or rales  CV: Regular rate and rhythm. No murmurs, rubs, or gallops.  Abd:   Non-distended, soft  Ext: No clubbing, cyanosis, edema.  MSK: Unassisted gait  Derm: No lesions on exposed skin  Psych: normal mood and affect        Assessment & Plan:     33 y.o. male with the following -     Problem List Items Addressed This Visit       Opioid use disorder     Severe OUD, in remission  On suboxone 8-2mg films BID, refilled  Helps with cravings, anxiety and withdrawal symptoms  Denies return to use while he was off the suboxone recently  Risk factors for return to use: out of sober living housing  Motivation for avoiding illicit opioid use: wants to avoid further criminal penalty, new job, avoiding previous social contacts (girlfriend is also sober)  Establishing with psychiatry today  Rx sent for naloxone and discussed overdose prevention/risks    CS agreement on file   UDS collected today  Reviewed NV   Refill: Rx suboxone 8-2mg #42 " for 3 week supply, no refills, follow-up appt scheduled 3/25             Relevant Medications    Buprenorphine HCl-Naloxone HCl 8-2 MG FILM    Other Relevant Orders    BUPRENORPHINE,UR,QNT    Anxiety     Establishing with psychiatry  Uses marijuana, has medical card         Intermittent asthma without complication     No active exacerbation  Requests refill of PRN inhaler which was sent today         Relevant Medications    albuterol 108 (90 Base) MCG/ACT Aero Soln inhalation aerosol    Cigarette nicotine dependence without complication     Discussed health benefits of reducing/quitting cigarettes  Patient pre-contemplative at this time  Will continue to address as interested            Follow-up: 3 weeks

## 2024-03-06 LAB
AMPHET CTO UR CFM-MCNC: NEGATIVE NG/ML
BARBITURATES CTO UR CFM-MCNC: NEGATIVE NG/ML
BENZODIAZ CTO UR CFM-MCNC: NEGATIVE NG/ML
CANNABINOIDS CTO UR CFM-MCNC: NORMAL NG/ML
COCAINE CTO UR CFM-MCNC: NEGATIVE NG/ML
CREAT UR-MCNC: 218.7 MG/DL (ref 20–400)
DRUG COMMENT 753798: NORMAL
METHADONE CTO UR CFM-MCNC: NEGATIVE NG/ML
OPIATES CTO UR CFM-MCNC: NEGATIVE NG/ML
PCP CTO UR CFM-MCNC: NEGATIVE NG/ML
PROPOXYPH CTO UR CFM-MCNC: NEGATIVE NG/ML

## 2024-03-08 PROBLEM — F17.210 CIGARETTE NICOTINE DEPENDENCE WITHOUT COMPLICATION: Status: ACTIVE | Noted: 2024-03-08

## 2024-03-08 PROBLEM — J45.20 INTERMITTENT ASTHMA WITHOUT COMPLICATION: Status: ACTIVE | Noted: 2024-03-08

## 2024-03-08 LAB — THC UR CFM-MCNC: >500 NG/ML

## 2024-03-08 NOTE — ASSESSMENT & PLAN NOTE
Discussed health benefits of reducing/quitting cigarettes  Patient pre-contemplative at this time  Will continue to address as interested

## 2024-03-08 NOTE — ASSESSMENT & PLAN NOTE
Severe OUD, in remission  On suboxone 8-2mg films BID, refilled  Helps with cravings, anxiety and withdrawal symptoms  Denies return to use while he was off the suboxone recently  Risk factors for return to use: out of sober living housing  Motivation for avoiding illicit opioid use: wants to avoid further criminal penalty, new job, avoiding previous social contacts (girlfriend is also sober)  Establishing with psychiatry today  Rx sent for naloxone and discussed overdose prevention/risks    CS agreement on file   UDS collected today  Reviewed NV   Refill: Rx suboxone 8-2mg #42 for 3 week supply, no refills, follow-up appt scheduled 3/25

## 2024-03-13 LAB
BUPRENO GLUC URQNT 2010093: 54 NG/ML
BUPRENORPH URQNT L34157: <2 NG/ML
BUPRENORPHINE UR-MCNC: 43 NG/ML
NALOX URQNT 2005863: <100 NG/ML
NORBUPR GLUC URQNT 2010094: 88 NG/ML

## 2024-04-05 ENCOUNTER — OFFICE VISIT (OUTPATIENT)
Dept: MEDICAL GROUP | Facility: CLINIC | Age: 34
End: 2024-04-05
Payer: MEDICAID

## 2024-04-05 VITALS
WEIGHT: 205 LBS | HEART RATE: 56 BPM | HEIGHT: 71 IN | BODY MASS INDEX: 28.7 KG/M2 | DIASTOLIC BLOOD PRESSURE: 74 MMHG | TEMPERATURE: 97.5 F | OXYGEN SATURATION: 95 % | RESPIRATION RATE: 18 BRPM | SYSTOLIC BLOOD PRESSURE: 134 MMHG

## 2024-04-05 DIAGNOSIS — F11.90 OPIOID USE DISORDER: ICD-10-CM

## 2024-04-05 PROCEDURE — 99213 OFFICE O/P EST LOW 20 MIN: CPT | Mod: GE | Performed by: STUDENT IN AN ORGANIZED HEALTH CARE EDUCATION/TRAINING PROGRAM

## 2024-04-05 RX ORDER — BUPRENORPHINE AND NALOXONE 8; 2 MG/1; MG/1
1 FILM, SOLUBLE BUCCAL; SUBLINGUAL 2 TIMES DAILY
Qty: 56 EACH | Refills: 0 | Status: SHIPPED | OUTPATIENT
Start: 2024-04-05 | End: 2024-05-03

## 2024-04-05 ASSESSMENT — ENCOUNTER SYMPTOMS
CONSTITUTIONAL NEGATIVE: 1
GASTROINTESTINAL NEGATIVE: 1
CARDIOVASCULAR NEGATIVE: 1
NERVOUS/ANXIOUS: 1
DEPRESSION: 1
NEUROLOGICAL NEGATIVE: 1
RESPIRATORY NEGATIVE: 1

## 2024-04-05 ASSESSMENT — LIFESTYLE VARIABLES: SUBSTANCE_ABUSE: 1

## 2024-04-05 NOTE — ASSESSMENT & PLAN NOTE
Severe OUD, in remission  On suboxone 8-2mg films BID, refilled 1 month supply at this time.  PDMP reviewed  Reports good control of  cravings, anxiety and withdrawal symptoms  Denies any opioid use outside of Suboxone      Was unable to establish with psychiatry.  Referral information was once again given to patient so he can call and schedule an appointment.    Patient has naloxone at home and we discussed the risk of overdose and signs of overdose.     CS agreement on file       Follow-up 1 month.

## 2024-04-05 NOTE — PROGRESS NOTES
Subjective:     CC:   Chief Complaint   Patient presents with    Follow-Up     Refill on medications           HPI:   Vikas presents today with need for refill on his Suboxone 8-2 mg twice daily.  Patient's last appointment was on 3/04/2024.  He had a schedule appointment on 3/25/2024 but was unable to make it due to scheduling conflicts with his work.  He recently started a new job at [employer] and has been doing well there.  - He has been doing well on his current dose of Suboxone.  He controls his opioid withdrawal symptoms.  Helps with his craving and functioning at work on a day-to-day basis.  - He denies any tremors, diarrhea.  - he denies any drug use other than marijuana which he uses for anxiety.    Patient still struggling with his mood.  Was unable to schedule additional appointments with his referral.  Information for the referral will be provided today    Problem   Opioid Use Disorder       Patient Active Problem List   Diagnosis    Urinary frequency    Opioid use disorder    Anxiety    Intermittent asthma without complication    Cigarette nicotine dependence without complication       Current Outpatient Medications Ordered in Epic   Medication Sig Dispense Refill    Buprenorphine HCl-Naloxone HCl 8-2 MG FILM Place 1 Film under the tongue 2 times a day for 28 days. 56 Each 0    Naloxone (NARCAN) 4 MG/0.1ML Liquid One spray in one nostril for overdose and call 911. 1 Each 2    albuterol 108 (90 Base) MCG/ACT Aero Soln inhalation aerosol Inhale 2 Puffs every four hours as needed for Shortness of Breath. 1 Each 2    oxybutynin SR (DITROPAN-XL) 10 MG CR tablet Take 1 Tablet by mouth every day. 30 Tablet 3     No current Epic-ordered facility-administered medications on file.       ROS:  Review of Systems   Constitutional: Negative.    HENT: Negative.     Respiratory: Negative.     Cardiovascular: Negative.    Gastrointestinal: Negative.    Genitourinary: Negative.    Neurological: Negative.   "  Psychiatric/Behavioral:  Positive for depression and substance abuse. Negative for suicidal ideas. The patient is nervous/anxious.          Objective:     Exam:  /74 (BP Location: Left arm, Patient Position: Sitting, BP Cuff Size: Adult)   Pulse (!) 56   Temp 36.4 °C (97.5 °F) (Temporal)   Resp 18   Ht 1.803 m (5' 11\")   Wt 93 kg (205 lb)   SpO2 95%   BMI 28.59 kg/m²  Body mass index is 28.59 kg/m².    Physical Exam  Constitutional:       Appearance: Normal appearance.   HENT:      Head: Normocephalic and atraumatic.      Nose: Nose normal.      Mouth/Throat:      Mouth: Mucous membranes are moist.   Eyes:      Extraocular Movements: Extraocular movements intact.      Conjunctiva/sclera: Conjunctivae normal.   Cardiovascular:      Rate and Rhythm: Normal rate and regular rhythm.      Heart sounds: Normal heart sounds.   Pulmonary:      Effort: Pulmonary effort is normal.      Breath sounds: Normal breath sounds.   Musculoskeletal:      Cervical back: Normal range of motion.   Skin:     General: Skin is warm.   Neurological:      General: No focal deficit present.      Mental Status: He is alert and oriented to person, place, and time.           Assessment & Plan:     33 y.o. male with the following -     Problem List Items Addressed This Visit       Opioid use disorder     Severe OUD, in remission  On suboxone 8-2mg films BID, refilled 1 month supply at this time.  PDMP reviewed  Reports good control of  cravings, anxiety and withdrawal symptoms  Denies any opioid use outside of Suboxone      Was unable to establish with psychiatry.  Referral information was once again given to patient so he can call and schedule an appointment.    Patient has naloxone at home and we discussed the risk of overdose and signs of overdose.     CS agreement on file       Follow-up 1 month.         Relevant Medications    Buprenorphine HCl-Naloxone HCl 8-2 MG FILM         Return in about 1 month (around " 5/5/2024).

## 2024-04-29 ENCOUNTER — TELEPHONE (OUTPATIENT)
Dept: MEDICAL GROUP | Facility: CLINIC | Age: 34
End: 2024-04-29
Payer: MEDICAID

## 2024-04-29 NOTE — TELEPHONE ENCOUNTER
VOICEMAIL  1. Caller Name: Vikas                      Call Back Number: [phone] (home)       2. Message: patient left  a voicemail, wondering if he can get a work excuse from you as he did call in to work due to mom being admitted to the ER, though work is requesting a note from the Dr.Please advise.     3. Patient approves office to leave a detailed voicemail/MyChart message: yes

## 2024-04-30 NOTE — TELEPHONE ENCOUNTER
Phone Number Called: [phone] (home)       Call outcome: Left detailed message for patient. Informed to call back with any additional questions.    Message: letter/note I have called the patient informing him of the note for work, left him a message that he well need to be seen to be able to give him a note for back to work.

## 2024-05-14 ENCOUNTER — OFFICE VISIT (OUTPATIENT)
Dept: MEDICAL GROUP | Facility: CLINIC | Age: 34
End: 2024-05-14
Payer: MEDICAID

## 2024-05-14 VITALS
OXYGEN SATURATION: 88 % | WEIGHT: 175 LBS | HEART RATE: 111 BPM | BODY MASS INDEX: 24.5 KG/M2 | DIASTOLIC BLOOD PRESSURE: 81 MMHG | HEIGHT: 71 IN | SYSTOLIC BLOOD PRESSURE: 134 MMHG | RESPIRATION RATE: 20 BRPM

## 2024-05-14 DIAGNOSIS — F11.90 OPIOID USE DISORDER: ICD-10-CM

## 2024-05-14 DIAGNOSIS — L73.1 INGROWN HAIR: ICD-10-CM

## 2024-05-14 PROCEDURE — 99213 OFFICE O/P EST LOW 20 MIN: CPT | Mod: GE

## 2024-05-14 RX ORDER — ALBUTEROL SULFATE 90 UG/1
2 AEROSOL, METERED RESPIRATORY (INHALATION) EVERY 4 HOURS PRN
Qty: 1 EACH | Refills: 2 | Status: SHIPPED | OUTPATIENT
Start: 2024-05-14

## 2024-05-14 RX ORDER — BUPRENORPHINE AND NALOXONE 8; 2 MG/1; MG/1
1 FILM, SOLUBLE BUCCAL; SUBLINGUAL 2 TIMES DAILY
Qty: 60 EACH | Refills: 0 | Status: SHIPPED | OUTPATIENT
Start: 2024-05-14 | End: 2024-06-13

## 2024-05-14 RX ORDER — NALOXONE HYDROCHLORIDE 4 MG/.1ML
SPRAY NASAL
Qty: 1 EACH | Refills: 2 | Status: SHIPPED | OUTPATIENT
Start: 2024-05-14

## 2024-05-14 NOTE — PROGRESS NOTES
"This note is formatted in an APSO format, for additional subjective and objective evaluation please scroll to the bottom of the note.    CC:  Chief Complaint   Patient presents with    Medication Management     Assessment/Plan:  Problem List Items Addressed This Visit       Ingrown hair     Ingrown hair on his thigh. Red swollen a week ago, worsening, then he popped it 2 days ago.  Not very painful, seems to be improving.  No sign of abscess on physical exam.  -Return precautions and ER precautions discussed  -Follow-up in 1 week for repeat physical exam         Opioid use disorder     Ran out of suboxone 4 days ago. Smoked heroin 3 days ago. Just using marijuana now.    +tremors  +cravings  +insomnia  +hot/cold  +cigarettes  Has his own place  Celebrate recovery  Wife is clean    -refilled suboxone  -Follow-up with [facility]         Relevant Medications    Buprenorphine HCl-Naloxone HCl 8-2 MG FILM      Orders Placed This Encounter    albuterol 108 (90 Base) MCG/ACT Aero Soln inhalation aerosol    Buprenorphine HCl-Naloxone HCl 8-2 MG FILM    Naloxone (NARCAN) 4 MG/0.1ML Liquid       No follow-ups on file.      LABS: Results reviewed and discussed with the patient, questions answered.    HISTORY OF PRESENT ILLNESS: Patient is a 33 y.o. male established patient who presents today with:    Problem   Ingrown Hair   Opioid Use Disorder       1. Opioid use disorder  Buprenorphine HCl-Naloxone HCl 8-2 MG FILM      2. Ingrown hair            Patient Active Problem List    Diagnosis Date Noted    Ingrown hair 05/16/2024    Intermittent asthma without complication 03/08/2024    Cigarette nicotine dependence without complication 03/08/2024    Anxiety 01/23/2024    Opioid use disorder 01/08/2024    Urinary frequency 01/02/2024        Exam:    /81 (BP Location: Right arm, Patient Position: Sitting, BP Cuff Size: Adult)   Pulse (!) 111   Resp 20   Ht 1.803 m (5' 11\")   Wt 79.4 kg (175 lb)   SpO2 88%   BMI " 24.41 kg/m²  Body mass index is 24.41 kg/m².    Gen: No apparent distress. Sitting comfortably on chair.  Small amount of dried blood and excoriations on left posterior head   neck: Supple, FROM  Chest: No deformities, Equal chest expansion  Lungs: Normal effort, CTA bilaterally.  CV: Regular rate and rhythm. Pulse palpable. No murmur  Abd: Non-distended.  Ext: No cyanosis. No edema.  Skin: Warm/dry.  Right proximal medial thigh with small phlegmon, only minimally tender to palpation, very localized erythema, no sign of spreading symptoms  Neuro: Non-focal. A&Ox4.  Psych: Normal behavior.  Nervous, slightly tremulous have      Poncho Kiran MD  [facility]

## 2024-05-14 NOTE — LETTER
[facility]     May 14, 2024    Patient: Vikas Ghosh   YOB: 1990   Date of Visit: 5/14/2024       To Whom It May Concern:    Vikas Ghosh was seen and treated in our department on 5/14/2024. He has been sick for the last 6 days. Please excuse him from work for that time period.    Sincerely,       Poncho Kiran M.D.

## 2024-05-16 PROBLEM — L73.1 INGROWN HAIR: Status: ACTIVE | Noted: 2024-05-16

## 2024-05-16 NOTE — ASSESSMENT & PLAN NOTE
Ingrown hair on his thigh. Red swollen a week ago, worsening, then he popped it 2 days ago.  Not very painful, seems to be improving.  No sign of abscess on physical exam.  -Return precautions and ER precautions discussed  -Follow-up in 1 week for repeat physical exam

## 2024-05-16 NOTE — ASSESSMENT & PLAN NOTE
Ran out of suboxone 4 days ago. Smoked heroin 3 days ago. Just using marijuana now.    +tremors  +cravings  +insomnia  +hot/cold  +cigarettes  Has his own place  Celebrate recovery  Wife is clean    -refilled suboxone  -Follow-up with [facility]

## 2024-05-17 ENCOUNTER — OFFICE VISIT (OUTPATIENT)
Dept: MEDICAL GROUP | Facility: CLINIC | Age: 34
End: 2024-05-17
Payer: MEDICAID

## 2024-05-17 VITALS
DIASTOLIC BLOOD PRESSURE: 75 MMHG | HEART RATE: 109 BPM | OXYGEN SATURATION: 98 % | HEIGHT: 71 IN | SYSTOLIC BLOOD PRESSURE: 121 MMHG | BODY MASS INDEX: 24.98 KG/M2 | TEMPERATURE: 97.1 F | WEIGHT: 178.4 LBS

## 2024-05-17 DIAGNOSIS — L73.1 INGROWN HAIR: ICD-10-CM

## 2024-05-17 PROCEDURE — 99212 OFFICE O/P EST SF 10 MIN: CPT | Mod: GE

## 2024-05-17 ASSESSMENT — PATIENT HEALTH QUESTIONNAIRE - PHQ9: CLINICAL INTERPRETATION OF PHQ2 SCORE: 0

## 2024-05-17 NOTE — LETTER
[facility]     May 17, 2024    Patient: Vikas Ghosh   YOB: 1990   Date of Visit: 5/17/2024       To Whom It May Concern:    Vikas Ghosh was seen and treated in our department on 5/14/24 and 5/17/2024. Please excuse him from work on 5/15/24.    Sincerely,         Poncho Kiran M.D.

## 2024-05-17 NOTE — PROGRESS NOTES
"This note is formatted in an APSO format, for additional subjective and objective evaluation please scroll to the bottom of the note.    CC: ingrown hair f/u    Assessment/Plan:  Problem List Items Addressed This Visit       Ingrown hair     Improved appearance. No sign of infection. No swelling/erythema/warmth. Non tender to palpation. No systemic sx, no fever.  - Return/er precautions discussed           No orders of the defined types were placed in this encounter.      No follow-ups on file.      LABS: Results reviewed and discussed with the patient, questions answered.    HISTORY OF PRESENT ILLNESS: Patient is a 33 y.o. male established patient who presents today with:    Problem   Ingrown Hair       1. Ingrown hair            Patient Active Problem List    Diagnosis Date Noted    Ingrown hair 05/16/2024    Intermittent asthma without complication 03/08/2024    Cigarette nicotine dependence without complication 03/08/2024    Anxiety 01/23/2024    Opioid use disorder 01/08/2024    Urinary frequency 01/02/2024        Exam:    /75 (BP Location: Left arm, Patient Position: Sitting, BP Cuff Size: Adult)   Pulse (!) 109   Temp 36.2 °C (97.1 °F) (Temporal)   Ht 1.803 m (5' 11\")   Wt 80.9 kg (178 lb 6.4 oz)   SpO2 98%   BMI 24.88 kg/m²  Body mass index is 24.88 kg/m².    Gen: Well appearing. No apparent distress. Well developed. Sitting comfortably on chair  HEENT: NCAT, MMM  Neck: Supple, FROM  Chest: No deformities, Equal chest expansion  Lungs: Normal effort, CTA bilaterally.  CV: Regular rate and rhythm. Pulse palpable. No murmur  Abd: Non-distended.  Ext: No cyanosis. No edema.  Skin: Warm/dry. No visible rashes. Scar on medial proximal right thigh, well healing, improved from last time. No swelling/erythema/warmth. Non tender to palpation. No sign of infection.   Neuro: Non-focal. A&Ox4.  Psych: Normal behavior, normal affect      Poncho Kiran MD  [facility]    "

## 2024-05-19 NOTE — ASSESSMENT & PLAN NOTE
Improved appearance. No sign of infection. No swelling/erythema/warmth. Non tender to palpation. No systemic sx, no fever.  - Return/er precautions discussed

## 2024-06-13 ENCOUNTER — OFFICE VISIT (OUTPATIENT)
Dept: MEDICAL GROUP | Facility: CLINIC | Age: 34
End: 2024-06-13
Payer: MEDICAID

## 2024-06-13 ENCOUNTER — APPOINTMENT (OUTPATIENT)
Dept: MEDICAL GROUP | Facility: CLINIC | Age: 34
End: 2024-06-13
Payer: MEDICAID

## 2024-06-13 VITALS
OXYGEN SATURATION: 95 % | HEIGHT: 71 IN | DIASTOLIC BLOOD PRESSURE: 86 MMHG | WEIGHT: 159 LBS | TEMPERATURE: 96.9 F | SYSTOLIC BLOOD PRESSURE: 142 MMHG | HEART RATE: 92 BPM | BODY MASS INDEX: 22.26 KG/M2

## 2024-06-13 DIAGNOSIS — L98.9 SKIN LESIONS: ICD-10-CM

## 2024-06-13 PROCEDURE — 99213 OFFICE O/P EST LOW 20 MIN: CPT | Mod: GE

## 2024-06-13 NOTE — PROGRESS NOTES
"Subjective:     CC: Skin abscess follow-up    HPI:   Vikas with OUD who presents today with:    Problem   Skin Lesions    Patient reports that he has noticed that whenever he gets a cut on his skin, it has been getting very red and producing pus since he had his abscess in May. The patient believes he may have had a blood infection in May that wasn't treated and might be causing his frequent skin infections. He he denies having any rashes prior to the infections occurring.  Patient states that for the past week he has felt feverish and overall unwell.  He reports a chronic history of vomiting and blood in stools which currently getting worked up.         Current Outpatient Medications Ordered in Epic   Medication Sig Dispense Refill    mupirocin (BACTROBAN) 2 % Ointment Apply 1 Application topically 2 times a day for 7 days. 22 g 0    albuterol 108 (90 Base) MCG/ACT Aero Soln inhalation aerosol Inhale 2 Puffs every four hours as needed for Shortness of Breath. 1 Each 2    Buprenorphine HCl-Naloxone HCl 8-2 MG FILM Place 1 Film under the tongue 2 times a day for 30 days. 60 Each 0    Naloxone (NARCAN) 4 MG/0.1ML Liquid One spray in one nostril for overdose and call 911. 1 Each 2    oxybutynin SR (DITROPAN-XL) 10 MG CR tablet Take 1 Tablet by mouth every day. 30 Tablet 3     No current Epic-ordered facility-administered medications on file.       ROS:  ROS as per HPI. Otherwise negative.      Objective:     Exam:  BP (!) 142/86   Pulse 92   Temp 36.1 °C (96.9 °F) (Temporal)   Ht 1.803 m (5' 11\")   Wt 72.1 kg (159 lb)   SpO2 95%   BMI 22.18 kg/m²  Body mass index is 22.18 kg/m².    Gen: Alert and oriented, No apparent distress.  HEENT: Normal TM's. Mucous membranes moist and clear. Neck is supple without lymphadenopathy.  Lungs: Normal effort, CTA bilaterally, no wheezes, rhonchi, or rales  CV: Regular rate and rhythm. No murmurs, rubs, or gallops.  Ext: No clubbing, cyanosis, edema.  Abdomen: No abdominal " Tawanda Sender adry called to confirm the [place] address for Sup[place] . He will come to  [facility] prior to [facility] procedure date but when the weather breaks. Writer returned patient's VM. He is advised the suprep sample is ready for him top pickup at the [place] office. tenderness. No visible or palpable masses. No rebound or guarding.    Assessment & Plan:     33 y.o. male with the following -     Problem List Items Addressed This Visit       Skin lesions     Discussed with patient that it is unlikely that he currently has a blood infection since his abscess in May as it would have worsened without antibiotics and likely caused him to be hospitalized by now.  Patient feels like his lesions do start similarly to impetigo and would like to try Bactroban twice daily for 7 days on his current skin lesions.  Printed out previous orders for labs which included CBC, CMP, HIV, and hepatitis C given his intermittent history of feeling feverish and unwell.  Patient was frustrated that I was unable to complete his request for absence from work April through June given that documentation of his infection was in May.  Patient left prior to being able to receive the print outs for labs.  Encouraged patient to get colonoscopy for further workup of his GI complaints.              Return in about 4 weeks (around 7/11/2024).         Yes...

## 2024-06-13 NOTE — ASSESSMENT & PLAN NOTE
Discussed with patient that it is unlikely that he currently has a blood infection since his abscess in May as it would have worsened without antibiotics and likely caused him to be hospitalized by now.  Patient feels like his lesions do start similarly to impetigo and would like to try Bactroban twice daily for 7 days on his current skin lesions.  Printed out previous orders for labs which included CBC, CMP, HIV, and hepatitis C given his intermittent history of feeling feverish and unwell.  Patient was frustrated that I was unable to complete his request for absence from work April through June given that documentation of his infection was in May.  Patient left prior to being able to receive the print outs for labs.  Encouraged patient to get colonoscopy for further workup of his GI complaints.